# Patient Record
Sex: FEMALE | Race: WHITE
[De-identification: names, ages, dates, MRNs, and addresses within clinical notes are randomized per-mention and may not be internally consistent; named-entity substitution may affect disease eponyms.]

---

## 2019-04-02 ENCOUNTER — HOSPITAL ENCOUNTER (OUTPATIENT)
Dept: HOSPITAL 96 - M.ERS | Age: 29
Setting detail: OBSERVATION
LOS: 1 days | Discharge: HOME | End: 2019-04-03
Attending: INTERNAL MEDICINE | Admitting: INTERNAL MEDICINE
Payer: COMMERCIAL

## 2019-04-02 VITALS — DIASTOLIC BLOOD PRESSURE: 73 MMHG | SYSTOLIC BLOOD PRESSURE: 126 MMHG

## 2019-04-02 VITALS — DIASTOLIC BLOOD PRESSURE: 56 MMHG | SYSTOLIC BLOOD PRESSURE: 115 MMHG

## 2019-04-02 VITALS — WEIGHT: 238 LBS | BODY MASS INDEX: 44.93 KG/M2 | HEIGHT: 60.98 IN

## 2019-04-02 VITALS — SYSTOLIC BLOOD PRESSURE: 121 MMHG | DIASTOLIC BLOOD PRESSURE: 67 MMHG

## 2019-04-02 DIAGNOSIS — Z79.899: ICD-10-CM

## 2019-04-02 DIAGNOSIS — R51: Primary | ICD-10-CM

## 2019-04-02 DIAGNOSIS — G52.9: ICD-10-CM

## 2019-04-02 DIAGNOSIS — R42: ICD-10-CM

## 2019-04-02 DIAGNOSIS — Z98.890: ICD-10-CM

## 2019-04-02 DIAGNOSIS — H02.9: ICD-10-CM

## 2019-04-02 DIAGNOSIS — H02.409: ICD-10-CM

## 2019-04-02 DIAGNOSIS — N39.0: ICD-10-CM

## 2019-04-02 DIAGNOSIS — F17.210: ICD-10-CM

## 2019-04-02 LAB
ABSOLUTE BASOPHILS: 0.1 THOU/UL (ref 0–0.2)
ABSOLUTE EOSINOPHILS: 0.2 THOU/UL (ref 0–0.7)
ABSOLUTE MONOCYTES: 0.5 THOU/UL (ref 0–1.2)
ALBUMIN SERPL-MCNC: 3.3 G/DL (ref 3.4–5)
ALP SERPL-CCNC: 67 U/L (ref 46–116)
ALT SERPL-CCNC: 28 U/L (ref 30–65)
ANION GAP SERPL CALC-SCNC: 7 MMOL/L (ref 7–16)
AST SERPL-CCNC: 22 U/L (ref 15–37)
BASOPHILS NFR BLD AUTO: 0.8 %
BILIRUB SERPL-MCNC: 0.3 MG/DL
BILIRUB UR-MCNC: NEGATIVE MG/DL
BUN SERPL-MCNC: 12 MG/DL (ref 7–18)
CALCIUM SERPL-MCNC: 9.1 MG/DL (ref 8.5–10.1)
CHLORIDE SERPL-SCNC: 105 MMOL/L (ref 98–107)
CO2 SERPL-SCNC: 29 MMOL/L (ref 21–32)
COLOR UR: YELLOW
CREAT SERPL-MCNC: 0.9 MG/DL (ref 0.6–1.3)
EOSINOPHIL NFR BLD: 2.3 %
GLUCOSE SERPL-MCNC: 90 MG/DL (ref 70–99)
GRANULOCYTES NFR BLD MANUAL: 51.5 %
HCT VFR BLD CALC: 44.9 % (ref 37–47)
HGB BLD-MCNC: 15 GM/DL (ref 12–15)
KETONES UR STRIP-MCNC: NEGATIVE MG/DL
LYMPHOCYTES # BLD: 2.7 THOU/UL (ref 0.8–5.3)
LYMPHOCYTES NFR BLD AUTO: 38.7 %
MCH RBC QN AUTO: 28.9 PG (ref 26–34)
MCHC RBC AUTO-ENTMCNC: 33.5 G/DL (ref 28–37)
MCV RBC: 86.4 FL (ref 80–100)
MONOCYTES NFR BLD: 6.7 %
MPV: 7 FL. (ref 7.2–11.1)
MUCUS: (no result) STRN/LPF
NEUTROPHILS # BLD: 3.6 THOU/UL (ref 1.6–8.1)
NUCLEATED RBCS: 0 /100WBC
PLATELET COUNT*: 388 THOU/UL (ref 150–400)
POTASSIUM SERPL-SCNC: 4.1 MMOL/L (ref 3.5–5.1)
PROT SERPL-MCNC: 7.9 G/DL (ref 6.4–8.2)
PROT UR QL STRIP: NEGATIVE
RBC # BLD AUTO: 5.2 MIL/UL (ref 4.2–5)
RBC # UR STRIP: NEGATIVE /UL
RBC #/AREA URNS HPF: (no result) /HPF (ref 0–2)
RDW-CV: 14.1 % (ref 10.5–14.5)
SODIUM SERPL-SCNC: 141 MMOL/L (ref 136–145)
SP GR UR STRIP: 1.02 (ref 1–1.03)
SQUAMOUS: (no result) /LPF (ref 0–3)
URINE CLARITY: CLEAR
URINE GLUCOSE-RANDOM: NEGATIVE
URINE LEUKOCYTES-REFLEX: (no result)
URINE NITRITE-REFLEX: NEGATIVE
URINE WBC-REFLEX: (no result) /HPF (ref 0–5)
UROBILINOGEN UR STRIP-ACNC: 0.2 E.U./DL (ref 0.2–1)
WBC # BLD AUTO: 7 THOU/UL (ref 4–11)

## 2019-04-02 NOTE — CON
[address]  [address]                    CONSULTATION                  
_______________________________________________________________________________
 
Name:       KISERDENISHA KATHERINE              Room:           [address] Keyla MANUEL#:  [id number]      Account #:      [id number]  
Admission:  04/02/19     Attend Phys:    KATHE Nguyen
Discharge:  04/03/19     Date of Birth:  [birth year]  
         Report #: [id number]
                                                                     [id number]  
_______________________________________________________________________________
THIS REPORT FOR:  //name//                      
 
CC: TIMOTHY physician/PCP
    Sumeet Urbina
 
DATE OF SERVICE:  04/03/2019
 
 
HISTORY OF PRESENT ILLNESS:  This is a 28-year-old female patient who was
evaluated by me for a new onset headache.  Headache is bilateral.  It is mostly
in the occipital area.  She is having some nonspecific symptom in the right eye.
 She indicates she can still see.  She is complaining of some droopy eye in that
region.  Headache started spontaneously.  It started about 3-4 days ago.  She
usually does not get headache.  Her blood pressure was somewhat high, but the
headache is continuous even when the blood pressure is pretty low.  She never
had headache before.  She is set up for pseudotumor cerebri because of her size,
but she is not complaining of ____.
 
REVIEW OF SYSTEMS:  Indicate that she is not in any distress.  She does have IUD
as a contraception.  Her pregnancy test is negative.  As mentioned above, she
did not have any headache before this happened.  She is complaining of multiple
nonspecific symptoms.
 
REVIEW OF SYSTEMS:  Otherwise unremarkable.
 
PAST MEDICAL HISTORY:  Negative for any migraine headache or headache.
 
FAMILY HISTORY:  Negative for any early age stroke.
 
SOCIAL HISTORY:  She says does not drink alcohol on a regular basis.
 
PHYSICAL EXAMINATION:  Indicate that the patient is alert, responsive, able to
follow simple commands.  Cranial nerve examination was unremarkable except I
could not have a very good look at the patient's fundus and I could not rule out
the possibility of papilledema.  She has unremarkable strength, sensation and
reflexes in all 4 extremities.  There is no meningeal sign.  There is no carotid
bruit.  Cardiac examination is unremarkable.  No respiratory difficulty or
rhonchi was noticed.  Blood pressure is 99/50, respirations 40, pulse is 68,
temperature is 98.8.
 
LABORATORY DATA:  Her white count is normal.  I reviewed her MRI and then did an
MRA and they are also unremarkable.
 
IMPRESSION AND PLAN:  It is not sure what the etiology of the patient's headache
is, but I am concerned because of the new onset of headache.  I discussed with
the patient that we do not have an ophthalmologist who come here on a regular
 
 
 
[place] [address]                    CONSULTATION                  
_______________________________________________________________________________
 
Name:       DENISHA KISER              [address]           [address]  [id number]      Account #:      [id number]  
Admission:  04/02/19     Attend Phys:    KATHE Nguyen
Discharge:  04/03/19     Date of Birth:  [birth year]  
         Report #: [id number]
                                                                     [id number]  
_______________________________________________________________________________
basis.  I discussed her options in that regard.  I think the best will be to
proceed with a spinal tap to measure the pressure and make sure there is no
subclinical infection going on.  I discussed with her indication, potential
complication, and alternatives of spinal tap in very detail with the patient. 
She understood all those and she wanted to proceed with it.  We will set it up
and I will talk to her primary about that.
 
More than 50 minutes of time was spent taking care of this patient and majority
of that time was spent counseling the patient on above matter and coordinating
her care.
 
 
 
 
 
 
 
 
 
 
 
 
 
 
 
 
 
 
 
 
 
 
 
 
 
 
 
 
 
 
 
 
 
 
                       
                                        By:                                
                 
D: 04/03/19 1143_______________________________________
T: 04/04/19 0131Pchela Thomas MD                /nt

## 2019-04-03 VITALS — SYSTOLIC BLOOD PRESSURE: 99 MMHG | DIASTOLIC BLOOD PRESSURE: 50 MMHG

## 2019-04-03 VITALS — SYSTOLIC BLOOD PRESSURE: 120 MMHG | DIASTOLIC BLOOD PRESSURE: 48 MMHG

## 2019-04-03 LAB
CHOLEST SERPL-MCNC: 137 MG/DL (ref ?–200)
HDLC SERPL-MCNC: 25 MG/DL (ref 40–?)
LDLC SERPL-MCNC: 81 MG/DL (ref ?–100)
TC:HDL: 5.5 RATIO
TRIGL SERPL-MCNC: 155 MG/DL (ref ?–150)
VLDLC SERPL CALC-MCNC: 31 MG/DL (ref ?–40)

## 2019-04-03 NOTE — 2DMMODE
[place] [address]
Phone:  [phone] 2 D/M-MODE ECHOCARDIOGRAM     
_______________________________________________________________________________
 
Name:         DENISHA KISER             Room:          [address] [place] 
[facility]#:    [id number]     Account #:     [id number]  
Admission:    19    Attend Phys:   Sumeet Urbina
Discharge:    19    Date of Birth: [birth year]  
Date of Service: 19 1509  Report #:      [id number]
        [id number]
_______________________________________________________________________________
THIS REPORT FOR:  //name//                      
 
 
--------------- APPROVED REPORT --------------
 
 
Study performed:  2019 10:49:34
 
EXAM: Comprehensive 2D, Doppler, and color-flow 
Echocardiogram 
Patient Location: In-Patient   
Room #:  [facility]     Status:  routine
 
     BSA:         2.03
HR: 56 bpm BP:          99/50 mmHg 
Rhythm: NSR    
 
Other Information 
Study Quality: Good
 
Indications
Possible CVA, headache
No bubble study as patient had no IV and Stroke had already been 
ruled out.
 
2D Dimensions
IVSd:  8.16 (7-11mm) LVOT Diam:  20.38 (18-24mm) 
LVDd:  43.18 mm  
PWd:  8.19 (7-11mm) Ascending Ao:  23.18 (22-36mm)
LVDs:  26.37 (25-40mm) 
Aortic Root:  26.50 mm 
 
Volumes
Left Atrial Volume (Systole) 
    LA ESV Index:  18.30 mL/m2
 
Aortic Valve
AoV Peak Biju.:  1.50 m/s 
AO Peak Gr.:  8.97 mmHg  LVOT Max P.05 mmHg
AO Mean Gr.:  5.13 mmHg  LVOT Mean PG:  3.46 mmHg
    LVOT Max V:  1.33 m/s
AO V2 VTI:  27.91 cm  LVOT Mean V:  0.85 m/s
ARSH (VTI):  3.06 cm2  LVOT V1 VTI:  26.15 cm
 
Mitral Valve
    E/A Ratio:  1.67
 
 
[place] [address]
Phone:  [phone]                     2 D/M-MODE ECHOCARDIOGRAM     
_______________________________________________________________________________
 
Name:         DENISHA KISER             Room:          [address]#:    [id number]     Account #:     [id number]  
Admission:    19    Attend Phys:   Sumeet Urbina
Discharge:    19    Date of Birth: [birth year]  
Date of Service: 19 1509  Report #:      [id number]
        [id number]
_______________________________________________________________________________
    MV Decel. Time:  186.05 ms
MV E Max Biuj.:  0.90 m/s 
MV PHT:  53.96 ms  
MVA (PHT):  4.08 cm2  
 
TDI
E/Lateral E':  4.29 E/Medial E':  7.50
   Medial E' Biju.:  0.12 m/s
   Lateral E' Biju.:  0.21 m/s
 
Pulmonary Valve
PV Peak Biju.:  1.31 m/s PV Peak Gr.:  6.87 mmHg
 
Left Ventricle
The left ventricle is normal size. There is normal LV segmental wall 
motion. There is normal left ventricular wall thickness. Left 
ventricular systolic function is normal. The left ventricular 
ejection fraction is within the normal range. LVEF is 60-65%. The 
left ventricular diastolic function is normal.
 
Right Ventricle
The right ventricle is normal size. The right ventricular systolic 
function is normal.
 
Atria
The left atrium size is normal. The right atrium size is 
normal.
 
Aortic Valve
The aortic valve is normal in structure. No aortic regurgitation is 
present. There is no aortic valvular stenosis.
 
Mitral Valve
The mitral valve is normal in structure. There is no mitral valve 
regurgitation noted. No evidence of mitral valve stenosis.
 
Tricuspid Valve
The tricuspid valve is normal in structure. Unable to assess PA 
pressure. Trace tricuspid regurgitation.
 
Pulmonic Valve
The pulmonary valve is normal in structure. There is no pulmonic 
valvular regurgitation.
 
Great Vessels
The aortic root is normal in size. IVC is normal in size and 
 
 
[place] [address]
Phone:  [phone]                     2 D/M-MODE ECHOCARDIOGRAM     
_______________________________________________________________________________
 
Name:         DENISHA KISER             Room:          [address] [place] 
..#:    [id number]     Account #:     [id number]  
Admission:    19    Attend Phys:   Sumeet Urbina
Discharge:    19    Date of Birth: [birth year]  
Date of Service: 19 1509  Report #:      [id number]
        [id number]
_______________________________________________________________________________
collapses >50% with inspiration.
 
Pericardium
There is no pericardial effusion.
 
<Conclusion>
The left ventricle is normal size.
There is normal left ventricular wall thickness.
Left ventricular systolic function is normal.
The left ventricular ejection fraction is within the normal 
range.
LVEF is 60-65%.
The left ventricular diastolic function is normal.
The right ventricle is normal size.
The left atrium size is normal.
The aortic valve is normal in structure.
The mitral valve is normal in structure.
The tricuspid valve is normal in structure.
IVC is normal in size and collapses >50% with inspiration.
There is no pericardial effusion.
There is normal LV segmental wall motion.
 
 
 
 
 
 
 
 
 
 
 
 
 
 
 
 
 
 
 
 
 
 
 
  <ELECTRONICALLY SIGNED>
                                           By: Aden Nguyen MD, FACC     
  19     1509
D: 19 1509   _____________________________________
T: 19 1509   Aden Nguyen MD, FACC       /INF

## 2020-07-18 ENCOUNTER — HOSPITAL ENCOUNTER (EMERGENCY)
Dept: HOSPITAL 96 - M.ERS | Age: 30
Discharge: HOME | End: 2020-07-18
Payer: COMMERCIAL

## 2020-07-18 VITALS — WEIGHT: 187 LBS | HEIGHT: 62 IN | BODY MASS INDEX: 34.41 KG/M2

## 2020-07-18 VITALS — DIASTOLIC BLOOD PRESSURE: 78 MMHG | SYSTOLIC BLOOD PRESSURE: 130 MMHG

## 2020-07-18 DIAGNOSIS — F17.210: ICD-10-CM

## 2020-07-18 DIAGNOSIS — F41.0: Primary | ICD-10-CM

## 2020-07-18 DIAGNOSIS — F41.9: ICD-10-CM

## 2020-07-18 LAB
ABSOLUTE BASOPHILS: 0.1 THOU/UL (ref 0–0.2)
ABSOLUTE EOSINOPHILS: 0.2 THOU/UL (ref 0–0.7)
ABSOLUTE MONOCYTES: 0.8 THOU/UL (ref 0–1.2)
ANION GAP SERPL CALC-SCNC: 10 MMOL/L (ref 7–16)
BASOPHILS NFR BLD AUTO: 0.9 %
BUN SERPL-MCNC: 12 MG/DL (ref 7–18)
CALCIUM SERPL-MCNC: 8.5 MG/DL (ref 8.5–10.1)
CHLORIDE SERPL-SCNC: 105 MMOL/L (ref 98–107)
CO2 SERPL-SCNC: 24 MMOL/L (ref 21–32)
CREAT SERPL-MCNC: 1 MG/DL (ref 0.6–1.3)
EOSINOPHIL NFR BLD: 1.5 %
GLUCOSE SERPL-MCNC: 98 MG/DL (ref 70–99)
GRANULOCYTES NFR BLD MANUAL: 55.3 %
HCT VFR BLD CALC: 43.1 % (ref 37–47)
HGB BLD-MCNC: 14.4 GM/DL (ref 12–15)
LYMPHOCYTES # BLD: 3.9 THOU/UL (ref 0.8–5.3)
LYMPHOCYTES NFR BLD AUTO: 35.2 %
MAGNESIUM SERPL-MCNC: 1.7 MG/DL (ref 1.8–2.4)
MCH RBC QN AUTO: 29.8 PG (ref 26–34)
MCHC RBC AUTO-ENTMCNC: 33.5 G/DL (ref 28–37)
MCV RBC: 88.9 FL (ref 80–100)
MONOCYTES NFR BLD: 7.1 %
MPV: 7.6 FL. (ref 7.2–11.1)
NEUTROPHILS # BLD: 6.1 THOU/UL (ref 1.6–8.1)
NUCLEATED RBCS: 0 /100WBC
PLATELET COUNT*: 412 THOU/UL (ref 150–400)
POTASSIUM SERPL-SCNC: 4.4 MMOL/L (ref 3.5–5.1)
RBC # BLD AUTO: 4.85 MIL/UL (ref 4.2–5)
RDW-CV: 14.1 % (ref 10.5–14.5)
SODIUM SERPL-SCNC: 139 MMOL/L (ref 136–145)
WBC # BLD AUTO: 11 THOU/UL (ref 4–11)